# Patient Record
Sex: MALE | Race: BLACK OR AFRICAN AMERICAN | Employment: UNEMPLOYED | URBAN - METROPOLITAN AREA
[De-identification: names, ages, dates, MRNs, and addresses within clinical notes are randomized per-mention and may not be internally consistent; named-entity substitution may affect disease eponyms.]

---
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
## 2024-02-07 ENCOUNTER — HOSPITAL ENCOUNTER (OUTPATIENT)
Facility: HOSPITAL | Age: 19
Setting detail: RECURRING SERIES
Discharge: HOME | End: 2024-02-10
Payer: MEDICAID

## 2024-02-07 PROCEDURE — 97161 PT EVAL LOW COMPLEX 20 MIN: CPT

## 2024-02-07 NOTE — PROGRESS NOTES
PHYSICAL / OCCUPATIONAL THERAPY - DAILY TREATMENT NOTE (updated )  For Eval visit    Patient Name: Kingsley Mcghee    Date: 2024    : 2005  Insurance: Payor: [facility] MEDICAID / Plan: [facility] HEALTHKEEPERS PLUS / Product Type: *No Product type* /      Patient  verified yes     Visit #   Current / Total 1 16   Time   In / Out 1:16 pm 1:51 pm   Pain   In / Out 1/10 1/10   Subjective Functional Status/Changes: See POC     TREATMENT AREA =  see POC    OBJECTIVE      30 min   Eval - untimed                      Therapeutic Procedures:    Tx Min Billable or 1:1 Min (if diff from Tx Min) Procedure, Rationale, Specifics         5  67852 Therapeutic Activity (timed):  use of dynamic activities replicating functional movements to increase ROM, strength, coordination, balance, and proprioception in order to improve patient's ability to progress to PLOF and address remaining functional goals.  (see flow sheet as applicable)     Details if applicable:  HEP Program and proper brace donning            Details if applicable:            Details if applicable:            Details if applicable:            Details if applicable:     5  [facility] Totals Reminder: bill using total billable min of TIMED therapeutic procedures (example: do not include dry needle or estim unattended, both untimed codes, in totals to left)  8-22 min = 1 unit; 23-37 min = 2 units; 38-52 min = 3 units; 53-67 min = 4 units; 68-82 min = 5 units   Total Total     [x]  Patient Education billed concurrently with other procedures   [x] Review HEP    [] Progressed/Changed HEP, detail:    [] Other detail:       Objective Information/Functional Measures/Assessment    See POC    Patient will continue to benefit from skilled PT / OT services to modify and progress therapeutic interventions, analyze and address functional mobility deficits, analyze and address ROM deficits, analyze and address strength deficits, analyze and address soft tissue

## 2024-02-07 NOTE — THERAPY EVALUATION
[facility]  [address], [address], [place] [address]  Ph:[phone] Fx:[phone]  Plan of Care / Statement of Necessity for Physical Therapy Services     Patient Name: Kingsley Mcghee : 2005   Medical   Diagnosis: Right knee pain [M25.561] Treatment Diagnosis: M25.561  RIGHT KNEE PAIN     Onset Date: 23     Referral Source: Jose Rubio MD Start of Care (SOC): 2024   Prior Hospitalization: See medical history Provider #: [id number]   Prior Level of Function: Played sports and independent   Comorbidities:  None     Post operative: URGENT: Patient was evaluated for a post operative condition and early physical therapy intervention is critical to positive outcomes.  Insurance authorization should be expedited to prevent delay in treatment.      Evaluation Complexity:  History:  LOW Complexity : Zero comorbidities / personal factors that will impact the outcome / POC; Examination:  MEDIUM Complexity : 3 Standardized tests and measures addressin body structure, function, activity limitation and / or participation in recreation  ;Presentation:  MEDIUM Complexity : Evolving with changing characteristics  ;Clinical Decision Making:  MEDIUM Complexity : FOTO score of 26-74 FOTO score = an established functional score where 100 = no disability  Overall Complexity Rating: LOW   Problem List: pain affecting function, decrease ROM, decrease strength, edema affection function, impaired gait/balance, decrease ADL/functional abilities, decrease activity tolerance, and decrease flexibility/joint mobility   Treatment Plan may include any combination of the followin Therapeutic Exercise, 80223 Neuromuscular Re-Education, 04120 Manual Therapy, 20957 Therapeutic Activity, 26568 Electrical Stim unattended, 42965 Electrical Stim attended, and 44016 Vasopneumatic Device  (Vasopnuematic compression justification:  Per bilateral girth measures taken and

## 2024-02-12 ENCOUNTER — HOSPITAL ENCOUNTER (OUTPATIENT)
Facility: HOSPITAL | Age: 19
Setting detail: RECURRING SERIES
Discharge: HOME | End: 2024-02-15
Payer: MEDICAID

## 2024-02-12 PROCEDURE — 97140 MANUAL THERAPY 1/> REGIONS: CPT

## 2024-02-12 PROCEDURE — 97110 THERAPEUTIC EXERCISES: CPT

## 2024-02-12 PROCEDURE — 97530 THERAPEUTIC ACTIVITIES: CPT

## 2024-02-12 PROCEDURE — 97112 NEUROMUSCULAR REEDUCATION: CPT

## 2024-02-12 NOTE — PROGRESS NOTES
functional movements to increase ROM, strength, coordination, balance, and proprioception in order to improve patient's ability to progress to PLOF and address remaining functional goals.  (see flow sheet as applicable)    Details if applicable:     21   82892 Neuromuscular Re-Education (timed):  improve balance, coordination, kinesthetic sense, posture, core stability and proprioception to improve patient's ability to develop conscious control of individual muscles and awareness of position of extremities in order to progress to PLOF and address remaining functional goals. (see flow sheet as applicable)    Details if applicable:  effleruage patella mob  passive stretch knee flex/overpressure ext right knee  semi reclined          Details if applicable:            Details if applicable:     61  [facility] Totals Reminder: bill using total billable min of TIMED therapeutic procedures (example: do not include dry needle or estim unattended, both untimed codes, in totals to left)  8-22 min = 1 unit; 23-37 min = 2 units; 38-52 min = 3 units; 53-67 min = 4 units; 68-82 min = 5 units   Total Total     [x]  Patient Education billed concurrently with other procedures   [x] Review HEP    [] Progressed/Changed HEP, detail:    [] Other detail:       Objective Information/Functional Measures/Assessment   Pt presents with right HS tightness during manual. Overall pt completed each strengthening and flexibility there ex well with cues for correct form.  Pt presents with  decreased mobility with inferior/superior mob during manual. Pt is currently ambulating with no AD with slight decreased heel to toe gait pattern.  PT benefited with treatment due to increase in mobility at right knee.      Patient will continue to benefit from skilled PT / OT services to modify and progress therapeutic interventions, analyze and address functional mobility deficits, analyze and address ROM deficits, analyze and address strength deficits, analyze and

## 2024-02-15 ENCOUNTER — HOSPITAL ENCOUNTER (OUTPATIENT)
Facility: HOSPITAL | Age: 19
Setting detail: RECURRING SERIES
Discharge: HOME | End: 2024-02-18
Payer: MEDICAID

## 2024-02-15 PROCEDURE — 97535 SELF CARE MNGMENT TRAINING: CPT

## 2024-02-15 PROCEDURE — 97110 THERAPEUTIC EXERCISES: CPT

## 2024-02-15 PROCEDURE — 97530 THERAPEUTIC ACTIVITIES: CPT

## 2024-02-15 PROCEDURE — 97112 NEUROMUSCULAR REEDUCATION: CPT

## 2024-02-15 NOTE — PROGRESS NOTES
Care  - Upgrade activities as tolerated    Daphne Maciel, PT    2/15/2024    1:33 PM    Future Appointments   Date Time Provider Department Center   2/19/2024  1:30 PM Aleja Albright, PTA [facility]   2/21/2024  1:30 PM Christina Pinon, PTA [facility]   2/26/2024  1:30 PM Zora Root, PT [facility]   2/28/2024  1:30 PM Christina Pinon, PTA [facility]   3/4/2024  1:30 PM Daphne Maciel, PT [facility]   3/6/2024  1:30 PM Zora Root, PT [facility]   3/11/2024  1:30 PM Christina Pinon, PTA [facility]   3/13/2024  1:30 PM Zora Root, PT [facility]

## 2024-02-21 ENCOUNTER — HOSPITAL ENCOUNTER (OUTPATIENT)
Facility: HOSPITAL | Age: 19
Setting detail: RECURRING SERIES
Discharge: HOME | End: 2024-02-24
Payer: MEDICAID

## 2024-02-21 PROCEDURE — 97112 NEUROMUSCULAR REEDUCATION: CPT

## 2024-02-21 PROCEDURE — 97530 THERAPEUTIC ACTIVITIES: CPT

## 2024-02-21 PROCEDURE — 97110 THERAPEUTIC EXERCISES: CPT

## 2024-02-21 NOTE — PROGRESS NOTES
PHYSICAL / OCCUPATIONAL THERAPY - DAILY TREATMENT NOTE    Patient Name: Kingsley Mcghee    Date: 2024    : 2005  Insurance: Payor: [facility] MEDICAID / Plan: [facility] HEALTHKEEPERS PLUS / Product Type: *No Product type* /      Patient  verified Yes     Visit #   Current / Total 4 16   Time   In / Out 1:30 2:30   Pain   In / Out 6 0   Subjective Functional Status/Changes: \"It felt like someone was on top pf me last night.\" \"My knee hurts today.\"     TREATMENT AREA =  Right knee pain [M25.561]    OBJECTIVE    Modalities Rationale:     decrease pain and increase tissue extensibility to improve patient's ability to progress to PLOF and address remaining functional goals.     min [] Estim Unattended, type/location:                                      []  w/ice    []  w/heat    min [] Estim Attended, type/location:                                     []  w/US     []  w/ice    []  w/heat    []  TENS insruct      min []  Mechanical Traction: type/lbs                   []  pro   []  sup   []  int   []  cont    []  before manual    []  after manual    min []  Ultrasound, settings/location:     10 min  unbill []  Ice     [x]  Heat    location/position: Long sit  right knee    min []  Paraffin,  details:     min []  Vasopneumatic Device, press/temp:     min []  Whirlpool / Fluido:    If using vaso (only need to measure limb vaso being performed on)      pre-treatment girth :       post-treatment girth :       measured at (landmark location) :      min []  Other:    Skin assessment post-treatment:   Intact      Therapeutic Procedures:    Tx Min Billable or 1:1 Min (if diff from Tx Min) Procedure, Rationale, Specifics   25  93057 Therapeutic Exercise (timed):  increase ROM, strength, coordination, balance, and proprioception to improve patient's ability to progress to PLOF and address remaining functional goals. (see flow sheet as applicable)     Details if applicable:  added  3#  per flow sheet     15

## 2024-02-26 ENCOUNTER — HOSPITAL ENCOUNTER (OUTPATIENT)
Facility: HOSPITAL | Age: 19
Setting detail: RECURRING SERIES
Discharge: HOME | End: 2024-02-29
Payer: MEDICAID

## 2024-02-26 PROCEDURE — 97112 NEUROMUSCULAR REEDUCATION: CPT

## 2024-02-26 PROCEDURE — 97530 THERAPEUTIC ACTIVITIES: CPT

## 2024-02-26 PROCEDURE — 97110 THERAPEUTIC EXERCISES: CPT

## 2024-02-26 NOTE — PROGRESS NOTES
PHYSICAL / OCCUPATIONAL THERAPY - DAILY TREATMENT NOTE    Patient Name: Kingsley Mcghee    Date: 2024    : 2005  Insurance: Payor: [facility] MEDICAID / Plan: [facility] HEALTHKEEPERS PLUS / Product Type: *No Product type* /      Patient  verified Yes     Visit #   Current / Total 5 16   Time   In / Out 1:30 pm 2:11 pm   Pain   In / Out 0 0   Subjective Functional Status/Changes: \"It's feeling so much better. Better and better each day.\"     TREATMENT AREA =  Right knee pain [M25.561]    OBJECTIVE      Therapeutic Procedures:    Tx Min Billable or 1:1 Min (if diff from Tx Min) Procedure, Rationale, Specifics   10  96870 Therapeutic Exercise (timed):  increase ROM, strength, coordination, balance, and proprioception to improve patient's ability to progress to PLOF and address remaining functional goals. (see flow sheet as applicable)     Details if applicable:      23  14912 Therapeutic Activity (timed):  use of dynamic activities replicating functional movements to increase ROM, strength, coordination, balance, and proprioception in order to improve patient's ability to progress to PLOF and address remaining functional goals.  (see flow sheet as applicable)     Details if applicable:     8  78297 Neuromuscular Re-Education (timed):  improve balance, coordination, kinesthetic sense, posture, core stability and proprioception to improve patient's ability to develop conscious control of individual muscles and awareness of position of extremities in order to progress to PLOF and address remaining functional goals. (see flow sheet as applicable)    Details if applicable:            Details if applicable:          41  [facility] Totals Reminder: bill using total billable min of TIMED therapeutic procedures (example: do not include dry needle or estim unattended, both untimed codes, in totals to left)  8-22 min = 1 unit; 23-37 min = 2 units; 38-52 min = 3 units; 53-67 min = 4 units; 68-82 min = 5 units

## 2024-03-06 ENCOUNTER — HOSPITAL ENCOUNTER (OUTPATIENT)
Facility: HOSPITAL | Age: 19
Setting detail: RECURRING SERIES
Discharge: HOME | End: 2024-03-09
Payer: MEDICAID

## 2024-03-06 PROCEDURE — 97530 THERAPEUTIC ACTIVITIES: CPT

## 2024-03-06 PROCEDURE — 97112 NEUROMUSCULAR REEDUCATION: CPT

## 2024-03-06 PROCEDURE — 97110 THERAPEUTIC EXERCISES: CPT

## 2024-03-06 NOTE — PROGRESS NOTES
PHYSICAL / OCCUPATIONAL THERAPY - DAILY TREATMENT NOTE    Patient Name: Kingsley Mcghee    Date: 3/6/2024    : 2005  Insurance: Payor: [facility] MEDICAID / Plan: [facility] HEALTHKEEPERS PLUS / Product Type: *No Product type* /      Patient  verified Yes     Visit #   Current / Total 6 8   Time   In / Out 1:30 pm 2:19 pm   Pain   In / Out 5 0   Subjective Functional Status/Changes: Pt reports that his knee is a little achy today because he just came from work and was on his feet all morning.     TREATMENT AREA =  Right knee pain [M25.561]    OBJECTIVE    Therapeutic Procedures:    Tx Min Billable or 1:1 Min (if diff from Tx Min) Procedure, Rationale, Specifics   20  98438 Therapeutic Activity (timed):  use of dynamic activities replicating functional movements to increase ROM, strength, coordination, balance, and proprioception in order to improve patient's ability to progress to PLOF and address remaining functional goals.  (see flow sheet as applicable)     Details if applicable:       19  93105 Therapeutic Exercise (timed):  increase ROM, strength, coordination, balance, and proprioception to improve patient's ability to progress to PLOF and address remaining functional goals. (see flow sheet as applicable)     Details if applicable:     10  84500 Neuromuscular Re-Education (timed):  improve balance, coordination, kinesthetic sense, posture, core stability and proprioception to improve patient's ability to develop conscious control of individual muscles and awareness of position of extremities in order to progress to PLOF and address remaining functional goals. (see flow sheet as applicable)     Details if applicable:           Details if applicable:            Details if applicable:     49  [facility] Totals Reminder: bill using total billable min of TIMED therapeutic procedures (example: do not include dry needle or estim unattended, both untimed codes, in totals to left)  8-22 min = 1 unit; 23-37 
To Whom It May Concern,    Pt had physical therapy appointment on 3/6/24 at 1:30pm. His upcoming appts are as followed.     Sincerely,  Zora Root DPT       [facility]        Zora Root PT    3/6/2024    2:17 PM    Future Appointments   Date Time Provider Department Center   3/11/2024  1:30 PM Kathrin, Crystal, PTA [facility]   3/13/2024  1:30 PM Zora Root PT [facility]   3/18/2024  1:30 PM Kathrin, Crystal, PTA [facility]   3/21/2024  1:30 PM Kathrin, Crystal, PTA [facility]   3/25/2024  1:30 PM Kathrin, Crystal, PTA [facility]   3/28/2024  1:30 PM Zora Root PT [facility]   4/1/2024  1:30 PM Kathrin, Crystal, PTA [facility]        
in  4-6  weeks:  1.  Decrease pain to 0/10 to assist with return to work without limitations.   EVAL: 10/10  CURRENT: 5/10 on avg at work on feet the whole time 3/6/24; progressing  2.  Improve FOTO Functional Status Score by 27 points in order to show significant functional improvement.  EVAL: 51 points  CURRENT: 57 point 3/6/24; progressing  3.  Improve R hip and knee strength to 5/5 MMT in all planes to match unaffected side and prepare pt for return to sport.  EVAL:  R hip and knee strength 3+/5 MMT in all planes.  CURRENT: R/L Hip Strength: Flexion = 4/4+   Abduction = 4/4+    Extension = 4/4+;  R/L Knee Strength: Extension = 4+/5    Flexion = 4/4+  3/6/24; progressing    Frequency / Duration:  Patient to be seen  1-2 times per week for 4-6  weeks:     Functional Gains: ability to walk and stand for as long as he wants  Functional Deficits: full squats, stairs, running and jumping  % improvement: 65  Pain   Average: 4/10       Best: 0/10     Worst: 8.5/10  Patient Goal: \"Pt would like to improve his ROM an strength and get back to being able to workout, play baseball\"    Payor: [facility] MEDICAID / Plan: [facility] HEALTHKEEPERS PLUS / Product Type: *No Product type* /     Non-Medicare, can change goals, can adjust or add frequency duration, no signature required      New Goals to be achieved in __4__ WEEKS    1. Improve knee ROM to 100-0 degs for progression to return to normal knee AROM.   PN: following manual right knee  3-100     2.  Decrease pain to 0/10 to assist with return to work without limitations.   PN: 5/10 on avg at work on feet the whole time     3.  Improve FOTO Functional Status Score by 27 points in order to show significant functional improvement.  PN: 57 point     4.  Improve R hip and knee strength to 5/5 MMT in all planes to match unaffected side and prepare pt for return to sport.  PN: R/L Hip Strength: Flexion = 4/4+   Abduction = 4/4+    Extension = 4/4+;  R/L Knee Strength:

## 2024-03-11 ENCOUNTER — HOSPITAL ENCOUNTER (OUTPATIENT)
Facility: HOSPITAL | Age: 19
Setting detail: RECURRING SERIES
Discharge: HOME | End: 2024-03-14
Payer: MEDICAID

## 2024-03-11 PROCEDURE — 97530 THERAPEUTIC ACTIVITIES: CPT

## 2024-03-11 PROCEDURE — 97110 THERAPEUTIC EXERCISES: CPT

## 2024-03-11 PROCEDURE — 97112 NEUROMUSCULAR REEDUCATION: CPT

## 2024-03-11 PROCEDURE — G0283 ELEC STIM OTHER THAN WOUND: HCPCS

## 2024-03-11 NOTE — PROGRESS NOTES
instruct in home and community integration to address functional deficits and attain remaining goals.    Progress toward goals / Updated goals:  []  See Progress Note/Recertification  1. Improve knee ROM to 100-0 degs for progression to return to normal knee AROM.   PN: following manual right knee  3-100      2.  Decrease pain to 0/10 to assist with return to work without limitations.   PN: 5/10 on avg at work on feet the whole time      3.  Improve FOTO Functional Status Score by 27 points in order to show significant functional improvement.  PN: 57 point      4.  Improve R hip and knee strength to 5/5 MMT in all planes to match unaffected side and prepare pt for return to sport.  PN: R/L Hip Strength: Flexion = 4/4+   Abduction = 4/4+    Extension = 4/4+;  R/L Knee Strength: Extension = 4+/5    Flexion = 4/4+      5. Patient is able to run/jog with normal gait pattern at least .5 miles to progress towards being able to due  fitness testing.   PN: currently unable to run yet         Next PN/ RC due 4/6/24  Auth due (visit number/ date) 15 visit  12/31/24    PLAN  - Continue Plan of Care    Christina Julians, PTA    3/11/2024    1:49 PM    Future Appointments   Date Time Provider Department Center   3/13/2024  1:30 PM Zora Root PT [facility]   3/18/2024  1:30 PM Kathrin, Crystal, PTA [facility]   3/21/2024  1:30 PM Kathrin Crystal, PTA [facility]   3/25/2024  1:30 PM Kathrin, Crystal, PTA [facility]   3/28/2024  1:30 PM Zora Root PT [facility]   4/1/2024  1:30 PM Kathrin, Crystal, PTA [facility]

## 2024-08-14 ENCOUNTER — HOSPITAL ENCOUNTER (EMERGENCY)
Facility: HOSPITAL | Age: 19
Discharge: HOME | End: 2024-08-14

## 2024-08-14 VITALS
TEMPERATURE: 98.3 F | DIASTOLIC BLOOD PRESSURE: 79 MMHG | HEART RATE: 55 BPM | RESPIRATION RATE: 18 BRPM | SYSTOLIC BLOOD PRESSURE: 140 MMHG | OXYGEN SATURATION: 100 %

## 2024-08-14 DIAGNOSIS — K02.9 DENTAL CARIES: Primary | ICD-10-CM

## 2024-08-14 DIAGNOSIS — K08.89 PAIN, DENTAL: ICD-10-CM

## 2024-08-14 PROCEDURE — 99283 EMERGENCY DEPT VISIT LOW MDM: CPT

## 2024-08-14 RX ORDER — CHLORHEXIDINE GLUCONATE ORAL RINSE 1.2 MG/ML
15 SOLUTION DENTAL
Status: DISCONTINUED | OUTPATIENT
Start: 2024-08-14 | End: 2024-08-15 | Stop reason: HOSPADM

## 2024-08-14 RX ORDER — PENICILLIN V POTASSIUM 500 MG/1
500 TABLET ORAL 4 TIMES DAILY
Qty: 40 TABLET | Refills: 0 | Status: SHIPPED | OUTPATIENT
Start: 2024-08-14 | End: 2024-08-24

## 2024-08-14 ASSESSMENT — ENCOUNTER SYMPTOMS
COUGH: 0
SHORTNESS OF BREATH: 0
BACK PAIN: 0

## 2024-08-14 ASSESSMENT — PAIN SCALES - GENERAL: PAINLEVEL_OUTOF10: 10

## 2024-08-14 ASSESSMENT — PAIN - FUNCTIONAL ASSESSMENT: PAIN_FUNCTIONAL_ASSESSMENT: 0-10

## 2024-08-14 ASSESSMENT — PAIN DESCRIPTION - ORIENTATION: ORIENTATION: LEFT;UPPER

## 2024-08-14 ASSESSMENT — PAIN DESCRIPTION - LOCATION: LOCATION: MOUTH

## 2024-08-15 NOTE — ED TRIAGE NOTES
Pt arrived via Triage ambulatory c/o left upper dental pain for the last 1.5 weeks. Pt has tried Aleve and Tylenol with no relief.

## 2024-08-15 NOTE — DISCHARGE INSTRUCTIONS
Follow-up with one of the provided dental clinics below:    [facility] [phone]  [facility] -Extraction [phone]  [facility] [phone], [phone]  [facility] [phone]  [facility] [phone]  [facility] [phone]  [facility] [phone], [phone]      Call to schedule an appointment.  Take medication as prescribed.   Return to ED for new or worsening symptoms.

## 2024-08-15 NOTE — ED PROVIDER NOTES
range of motion and neck supple. No rigidity or tenderness.   Lymphadenopathy:      Cervical: No cervical adenopathy.   Skin:     General: Skin is warm.      Capillary Refill: Capillary refill takes less than 2 seconds.      Coloration: Skin is not jaundiced.      Findings: No erythema.   Neurological:      Mental Status: He is alert and oriented to person, place, and time.      Motor: No weakness.   Psychiatric:         Mood and Affect: Mood normal.         Behavior: Behavior normal.         DIAGNOSTIC RESULTS         Interpretation per the Radiologist below, if available at the time of this note:    No orders to display         ED BEDSIDE ULTRASOUND:   Performed by ED Physician - none    LABS:  Labs Reviewed - No data to display    All other labs were within normal range or not returned as of this dictation.    EMERGENCY DEPARTMENT COURSE and DIFFERENTIAL DIAGNOSIS/MDM:   Vitals:    Vitals:    08/14/24 2044   BP: (!) 140/79   Pulse: 55   Resp: 18   Temp: 98.3 °F (36.8 °C)   TempSrc: Oral   SpO2: 100%       20 y/o M with left upper dental pain and gu, tenderness x 1.5 weeks. No sublingual swelling. No dental abscess. Dental chika present. Patient is afebrile and normotensive.     Medical Decision Making  Risk  Prescription drug management.      Patient prescribed Pen VK and provided dental list for f/u. Patient discharged with strict ED return precautions.           FINAL IMPRESSION      1. Dental caries    2. Pain, dental          DISPOSITION/PLAN   DISPOSITION Decision To Discharge 08/14/2024 09:27:37 PM  Condition at Disposition: Stable      PATIENT REFERRED TO:  Dentist    Go in 1 day  for check up    [facility] EMERGENCY DEPT  [address] [place] [id number] [phone]    As needed, If symptoms worsen      DISCHARGE MEDICATIONS:  New Prescriptions    PENICILLIN V POTASSIUM (VEETID) 500 MG TABLET    Take 1 tablet by mouth 4 times daily for 10 days     Controlled Substances Monitoring:          No data to